# Patient Record
Sex: FEMALE | Race: BLACK OR AFRICAN AMERICAN
[De-identification: names, ages, dates, MRNs, and addresses within clinical notes are randomized per-mention and may not be internally consistent; named-entity substitution may affect disease eponyms.]

---

## 2019-12-16 ENCOUNTER — HOSPITAL ENCOUNTER (INPATIENT)
Dept: HOSPITAL 74 - FM/S | Age: 44
LOS: 1 days | Discharge: HOME | DRG: 403 | End: 2019-12-17
Attending: SURGERY | Admitting: SURGERY
Payer: COMMERCIAL

## 2019-12-16 VITALS — BODY MASS INDEX: 46.5 KG/M2

## 2019-12-16 DIAGNOSIS — R16.1: ICD-10-CM

## 2019-12-16 DIAGNOSIS — R16.0: ICD-10-CM

## 2019-12-16 DIAGNOSIS — E66.01: Primary | ICD-10-CM

## 2019-12-16 DIAGNOSIS — E11.9: ICD-10-CM

## 2019-12-16 LAB
ALBUMIN SERPL-MCNC: 3.4 G/DL (ref 3.4–5)
ALP SERPL-CCNC: 45 U/L (ref 45–117)
ALT SERPL-CCNC: 29 U/L (ref 13–61)
ANION GAP SERPL CALC-SCNC: 9 MMOL/L (ref 8–16)
AST SERPL-CCNC: 45 U/L (ref 15–37)
BILIRUB SERPL-MCNC: 0.5 MG/DL (ref 0.2–1)
BUN SERPL-MCNC: 9 MG/DL (ref 7–18)
CALCIUM SERPL-MCNC: 8.2 MG/DL (ref 8.5–10)
CHLORIDE SERPL-SCNC: 105 MMOL/L (ref 98–107)
CO2 SERPL-SCNC: 22 MMOL/L (ref 21–32)
CREAT SERPL-MCNC: 0.7 MG/DL (ref 0.55–1.3)
DEPRECATED RDW RBC AUTO: 15.6 % (ref 11.6–15.6)
GLUCOSE SERPL-MCNC: 134 MG/DL (ref 74–106)
HCT VFR BLD CALC: 32.9 % (ref 32.4–45.2)
HGB BLD-MCNC: 10.6 GM/DL (ref 10.7–15.3)
MCH RBC QN AUTO: 25.2 PG (ref 25.7–33.7)
MCHC RBC AUTO-ENTMCNC: 32.4 G/DL (ref 32–36)
MCV RBC: 77.9 FL (ref 80–96)
PLATELET # BLD AUTO: 227 K/MM3 (ref 134–434)
PMV BLD: 9.9 FL (ref 7.5–11.1)
POTASSIUM SERPLBLD-SCNC: 3.7 MMOL/L (ref 3.5–5.1)
PROT SERPL-MCNC: 6.3 G/DL (ref 6.4–8.2)
RBC # BLD AUTO: 4.22 M/MM3 (ref 3.6–5.2)
SODIUM SERPL-SCNC: 136 MMOL/L (ref 136–145)
WBC # BLD AUTO: 14.7 K/MM3 (ref 4–10.8)

## 2019-12-16 PROCEDURE — 0DB64Z3 EXCISION OF STOMACH, PERCUTANEOUS ENDOSCOPIC APPROACH, VERTICAL: ICD-10-PCS | Performed by: SURGERY

## 2019-12-16 PROCEDURE — 0FB24ZX EXCISION OF LEFT LOBE LIVER, PERCUTANEOUS ENDOSCOPIC APPROACH, DIAGNOSTIC: ICD-10-PCS | Performed by: SURGERY

## 2019-12-16 PROCEDURE — 0DJ04ZZ INSPECTION OF UPPER INTESTINAL TRACT, PERCUTANEOUS ENDOSCOPIC APPROACH: ICD-10-PCS | Performed by: SURGERY

## 2019-12-16 RX ADMIN — ENOXAPARIN SODIUM SCH MG: 40 INJECTION SUBCUTANEOUS at 21:50

## 2019-12-16 RX ADMIN — METOCLOPRAMIDE SCH MG: 5 INJECTION, SOLUTION INTRAMUSCULAR; INTRAVENOUS at 18:15

## 2019-12-16 RX ADMIN — ONDANSETRON SCH MG: 2 INJECTION INTRAMUSCULAR; INTRAVENOUS at 21:51

## 2019-12-16 RX ADMIN — METOCLOPRAMIDE SCH MG: 5 INJECTION, SOLUTION INTRAMUSCULAR; INTRAVENOUS at 22:47

## 2019-12-16 RX ADMIN — ONDANSETRON SCH MG: 2 INJECTION INTRAMUSCULAR; INTRAVENOUS at 18:15

## 2019-12-16 RX ADMIN — ACETAMINOPHEN SCH MG: 10 INJECTION, SOLUTION INTRAVENOUS at 22:47

## 2019-12-16 RX ADMIN — FAMOTIDINE SCH MLS/HR: 20 INJECTION, SOLUTION INTRAVENOUS at 21:51

## 2019-12-16 RX ADMIN — ACETAMINOPHEN SCH MG: 10 INJECTION, SOLUTION INTRAVENOUS at 18:55

## 2019-12-16 NOTE — OP
DATE OF OPERATION:  12/16/2019

 

PLACE OF SURGERY:  [facility], [address] [place] [address]

 

SURGEON:  Stanley Johansen MD

 

ASSISTANT:  Nikhil Torres M.D. 

 

PREOPERATIVE DIAGNOSIS:  

1.  Morbid obesity.  

2.  Diabetes mellitus.  

 

POSTOPERATIVE DIAGNOSIS:  

1.  Morbid obesity.  

2.  Diabetes mellitus.  

3.  Splenomegaly.  

4.  Oozing from gastric staple line.

 

PROCEDURE:

1.  Diagnostic laparoscopy.  

2.  Laparoscopic vertical sleeve gastrectomy. 

3.  Laparoscopic wedge liver biopsy. 

4.  Laparoscopic oversewing of gastric staple line for oozing.  

 

SPECIMEN:

1.  Greater curvature of the stomach.

2.  Liver biopsy.  

 

ESTIMATED BLOOD LOSS:  30 mL.

 

DRAINS:  None.  

 

ANESTHESIA:  GT.

 

BOOGIE:  Size 36 [place].  

 

REASON FOR PROCEDURE:  This is a 44-year-old female who presents to the office 
for

weight loss options and decided to proceed with laparoscopic, possible open

vertical sleeve gastrectomy, possible liver biopsy, upper endoscopy.  

 

ADDENDUM:  In addition, the gastric staple line needed to be oversewn

laparoscopically.  This was done with an endostitch device and surgitac  
suture.  Hemostasis was noted.  

 

RISKS AND BENEFITS:  After describing the different options for weight loss

management, the patient decided to proceed with a laparoscopic, possible open

vertical sleeve gastrectomy.  The patient was seen by the respective 
subspecialties

and cleared for surgery.  The risks and benefits of the procedure were 
explained. 

These included bleeding, infection, hernia, MI, DVT, PE, injury to surrounding

structures including the liver, colon, bowel, spleen, esophagus, vessel injury, 
nerve

injury, weight regain, gastric leak, staple line leak, sleeve leak, obstruction,

vitamin deficiency, hair loss and death as some of the possible complications.  
The

patient understood and signed informed consent.

 

DESCRIPTION OF PROCEDURE:  The patient was placed supine on the operating room 
table.

 The patient underwent general endotracheal intubation.  The arms were brought 
out at

90 degrees and secured.  A footboard was placed and the legs were secured 
laterally

with padding.  The abdomen was prepped and draped in the usual sterile fashion.
  A

timeout was performed.

 

An incision was made in the left upper quadrant and a Veress needle inserted. 

Pneumoperitoneum was established.  Subsequently, the Veress needle was removed 
and a

5-mm trocar was placed under direct visualization with the laparoscope.  The

laparoscopic camera was then inserted and inspection of the abdominal cavity was

performed.  An incision was then made in the supraumbilical area and a 15-mm 
trocar

was placed under direct visualization.  A 5-mm trocar was then placed in the 
right

upper quadrant and a 5-mm trocar was placed below the left subcostal margin.  A 
stab

wound was made in the subxiphoid area and a Trinity clamp inserted and removed to

dilate the tract.  A Salinas liver retractor was inserted.  The post was 
secured at

the bedside by the nursing staff.  The patient was placed in steep reverse

Trendelenburg position and the [place] liver retractor was used to secure the 
liver

towards the anterior abdominal wall.  

 

The pylorus was identified and 6 cm proximal to it, the lesser sac was entered 
using

the LigaSure device.  All lateral attachments to the greater curvature of the

stomach, including the short gastric vessels, were ligated using the LigaSure 
device

toward the gastrosplenic and gastrophrenic ligaments.  Once this was done in its

entirety, it was confirmed that all tubes within the nasal or oropharyngeal 
cavity,

including a temperature probe were removed by Anesthesia.  The bougie was then

inserted by Anesthesia.  Transection of the stomach was then begun staying 
adjacent

to the bougie but away from the angularis.  Transection of the stomach was 
performed

near the portion of the stomach where the lesser sac was entered.  Two 
laparoscopic

Endo-ADRIANNA black staples were used at this location.  Laparoscopic Endo ADRIANNA purple

staple loads were then used for the remainder of the transection until the 
greater

curvature of the stomach was fully transected.  This was done staying close to 
the

bougie.  Care was taken to stay away from the angle of His cephalad.  The 
staple line

was then inspected.  Hemostasis was identified.  A leak test was then 
performed.  It

was clamped distally to the staple line.  Irrigation solution was placed in the 
left

upper quadrant and air was insufflated by Anesthesia into the sleeve.  No leaks 
were

identified.  No obstruction was identified.  This was done through the entirety 
of

the staple line.  The stomach was suctioned and the bougie removed fully intact 
under

direct visualization.  At this point, the irrigation solution was suctioned and

again, hemostasis was noted.  

 

A wedge liver biopsy was then performed.  The left lobe of the liver was 
identified. 

A portion of the edge of the left lobe of the liver was grasped.  Using

electrocautery, a wedge of the left liver was excised.  The specimen was 
removed and

sent off the field.  Hemostasis of the wedge liver biopsy site was attained and 
noted

using electrocautery. 

 

The 15-mm supraumbilical trocar was then removed and the greater curvature 
specimen

removed from the site using a sponge stick andres.  A Semaj-Marisela device 
was then

used to close the fascia with a 0 Vicryl suture at the site.  Again, hemostasis 
was

noted. 

 

The Salinas liver retractor was then removed under direct visualization. 

Pneumoperitoneum was desufflated.  Hemostasis was noted at all incision sites 
and

Marcaine was injected at all incision sites.  A 3-0 Vicryl suture was used to 
close

the deep subcutaneous tissue at the 15-mm incision site.  All incision sites 
were

closed using 4-0 Biosyn.  Sterile dressings were applied.  The patient 
tolerated the

procedure well and was transferred to the recovery room in stable condition.  

 

 

LADAN [id number]

DD: 12/16/2019 17:34

DT: 12/16/2019 19:59

Job #:  [id number]

ROSANNA

## 2019-12-16 NOTE — HP
Admitting History and Physical





- Admission


Chief Complaint: Morbid obesity


History Source: Patient


Limitations to Obtaining History: No Limitations





- Past Medical History


...LMP: 11/18/19


Endocrine: Yes: Diabetes Mellitus





- Past Surgical History


Additional Past Surgical History: 





cervical polypectomy


tubal ligation





- Smoking History


Smoking history: Never smoked


Have you smoked in the past 12 months: No





- Alcohol/Substance Use


Hx Alcohol Use: Yes (RARELY)





- Social History


ADL: Independent





Home Medications





- Allergies


Allergies/Adverse Reactions: 


 Allergies











Allergy/AdvReac Type Severity Reaction Status Date / Time


 


No Known Drug Allergies Allergy   Verified 12/13/19 15:31














- Home Medications


Home Medications: 


Ambulatory Orders





Metformin HCl [Glucophage] 500 mg PO DAILY 12/13/19 











Family Medical History


Family History: Denies





Review of Systems





- Review of Systems


Constitutional: denies: Chills, Fever


HENT: reports: No Symptoms


Neck: reports: No Symptoms


Cardiovascular: reports: No Symptoms


Respiratory: reports: No Symptoms


Gastrointestinal: reports: No Symptoms


Neurological: reports: No Symptoms


Pain Intensity: 0





Physical Examination


Vital Signs: 


 Vital Signs











Temperature  98 F   12/16/19 12:40


 


Pulse Rate  76   12/16/19 12:40


 


Respiratory Rate  18   12/16/19 12:40


 


Blood Pressure  116/70   12/16/19 12:40


 


O2 Sat by Pulse Oximetry (%)      











Constitutional: Yes: Calm


Neck: Yes: WNL


Cardiovascular: Yes: WNL


Respiratory: Yes: WNL


Gastrointestinal: Yes: Soft, Abdomen, Obese


Neurological: Yes: Alert, Oriented





Problem List





- Problems


(1) Morbid obesity due to excess calories


Code(s): E66.01 - MORBID (SEVERE) OBESITY DUE TO EXCESS CALORIES   





(2) BMI 45.0-49.9, adult


Code(s): Z68.42 - BODY MASS INDEX (BMI) 45.0-49.9, ADULT   





Assessment/Plan





Laparoscopic possible open vertical sleeve gastrectomy, possible liver biopsy, 

upper endoscopy

## 2019-12-16 NOTE — OPR
Operative Note 





Operative Date: 12/16/19





Pre-Operative Diagnosis: Morbid obesity; BMI 46.5


Operation: 


 1. Diagnostic laparoscopy.  


 2. Laparoscopic vertical sleeve gastrectomy.  


 3. Laparoscopic wedge liver biopsy.  


 4. Laparoscopic oversewing of gastric staple line


Post-Operative Diagnosis: Same as Pre-op (as well as hepatomegaly and oozing 

from gastric staple line)


Surgeon: Stanley Johansen


Assistant: Nikhil Torres


Anesthesia: General


Specimens Removed: Greater curvature of stomach.  Liver biopsy.


Estimated Blood Loss (mls): 30


Drains & Tubes with Location: 36 Fr Bougie


Operative Report Dictated: Yes

## 2019-12-17 VITALS — HEART RATE: 88 BPM | TEMPERATURE: 98.9 F

## 2019-12-17 VITALS — DIASTOLIC BLOOD PRESSURE: 46 MMHG | SYSTOLIC BLOOD PRESSURE: 101 MMHG

## 2019-12-17 LAB
ALBUMIN SERPL-MCNC: 3.2 G/DL (ref 3.4–5)
ALP SERPL-CCNC: 39 U/L (ref 45–117)
ALT SERPL-CCNC: 35 U/L (ref 13–61)
ANION GAP SERPL CALC-SCNC: 8 MMOL/L (ref 8–16)
AST SERPL-CCNC: 44 U/L (ref 15–37)
BILIRUB SERPL-MCNC: 0.6 MG/DL (ref 0.2–1)
BUN SERPL-MCNC: 8 MG/DL (ref 7–18)
CALCIUM SERPL-MCNC: 8 MG/DL (ref 8.5–10)
CHLORIDE SERPL-SCNC: 107 MMOL/L (ref 98–107)
CO2 SERPL-SCNC: 20 MMOL/L (ref 21–32)
CREAT SERPL-MCNC: 0.7 MG/DL (ref 0.55–1.3)
DEPRECATED RDW RBC AUTO: 15.6 % (ref 11.6–15.6)
GLUCOSE SERPL-MCNC: 107 MG/DL (ref 74–106)
HCT VFR BLD CALC: 28.6 % (ref 32.4–45.2)
HGB BLD-MCNC: 9.3 GM/DL (ref 10.7–15.3)
MCH RBC QN AUTO: 25.1 PG (ref 25.7–33.7)
MCHC RBC AUTO-ENTMCNC: 32.5 G/DL (ref 32–36)
MCV RBC: 77.4 FL (ref 80–96)
PLATELET # BLD AUTO: 216 K/MM3 (ref 134–434)
PMV BLD: 9.8 FL (ref 7.5–11.1)
POTASSIUM SERPLBLD-SCNC: 4 MMOL/L (ref 3.5–5.1)
PROT SERPL-MCNC: 6.2 G/DL (ref 6.4–8.2)
RBC # BLD AUTO: 3.69 M/MM3 (ref 3.6–5.2)
SODIUM SERPL-SCNC: 135 MMOL/L (ref 136–145)
WBC # BLD AUTO: 11.6 K/MM3 (ref 4–10.8)

## 2019-12-17 RX ADMIN — METOCLOPRAMIDE SCH MG: 5 INJECTION, SOLUTION INTRAMUSCULAR; INTRAVENOUS at 12:20

## 2019-12-17 RX ADMIN — ACETAMINOPHEN SCH MG: 10 INJECTION, SOLUTION INTRAVENOUS at 06:31

## 2019-12-17 RX ADMIN — ENOXAPARIN SODIUM SCH MG: 40 INJECTION SUBCUTANEOUS at 09:45

## 2019-12-17 RX ADMIN — METOCLOPRAMIDE SCH MG: 5 INJECTION, SOLUTION INTRAMUSCULAR; INTRAVENOUS at 18:06

## 2019-12-17 RX ADMIN — SODIUM CHLORIDE SCH MLS/HR: 9 INJECTION, SOLUTION INTRAVENOUS at 02:26

## 2019-12-17 RX ADMIN — ONDANSETRON SCH MG: 2 INJECTION INTRAMUSCULAR; INTRAVENOUS at 14:04

## 2019-12-17 RX ADMIN — INSULIN ASPART SCH: 100 INJECTION, SOLUTION INTRAVENOUS; SUBCUTANEOUS at 18:05

## 2019-12-17 RX ADMIN — ONDANSETRON SCH MG: 2 INJECTION INTRAMUSCULAR; INTRAVENOUS at 02:26

## 2019-12-17 RX ADMIN — FAMOTIDINE SCH MLS/HR: 20 INJECTION, SOLUTION INTRAVENOUS at 09:45

## 2019-12-17 RX ADMIN — ONDANSETRON SCH MG: 2 INJECTION INTRAMUSCULAR; INTRAVENOUS at 18:06

## 2019-12-17 RX ADMIN — SODIUM CHLORIDE SCH MLS/HR: 9 INJECTION, SOLUTION INTRAVENOUS at 18:06

## 2019-12-17 RX ADMIN — INSULIN ASPART SCH: 100 INJECTION, SOLUTION INTRAVENOUS; SUBCUTANEOUS at 12:21

## 2019-12-17 RX ADMIN — INSULIN ASPART SCH: 100 INJECTION, SOLUTION INTRAVENOUS; SUBCUTANEOUS at 06:31

## 2019-12-17 RX ADMIN — ONDANSETRON SCH MG: 2 INJECTION INTRAMUSCULAR; INTRAVENOUS at 09:45

## 2019-12-17 RX ADMIN — ACETAMINOPHEN SCH MG: 10 INJECTION, SOLUTION INTRAVENOUS at 12:19

## 2019-12-17 RX ADMIN — ONDANSETRON SCH MG: 2 INJECTION INTRAMUSCULAR; INTRAVENOUS at 06:30

## 2019-12-17 RX ADMIN — METOCLOPRAMIDE SCH MG: 5 INJECTION, SOLUTION INTRAMUSCULAR; INTRAVENOUS at 06:27

## 2019-12-17 NOTE — PN
Progress Note (short form)





- Note


Progress Note: 





ANESTHESIA POSTOP


45 YO female POD#1 s/p lap gastric sleeve


Patient resting in bed. Pain adequately controlled.


VSS, Afebrile


Continue current care. Encouraged IS and ambulation. No anesthetic 

complications.

## 2019-12-17 NOTE — DS
Physical Exam: 


SUBJECTIVE: Patient seen and examined








OBJECTIVE:





 Vital Signs











Temperature  99.2 F   12/17/19 06:00


 


Pulse Rate  89   12/17/19 06:00


 


Respiratory Rate  18   12/17/19 06:00


 


Blood Pressure  111/48 L  12/17/19 06:00


 


O2 Sat by Pulse Oximetry (%)  98   12/17/19 06:00








PHYSICAL EXAM





GENERAL: The patient is awake, alert, and fully oriented, in no acute distress.


HEAD: Normal with no signs of trauma.


EYES: PERRL, extraocular movements intact, sclera anicteric, conjunctiva clear. 


ENT: Ears normal, nares patent, oropharynx clear without exudates, moist mucous 

membranes.


NECK: Trachea midline, full range of motion, supple. 


LUNGS: breathing comfortably,  no accessory muscle use. 


ABDOMEN: Soft, mild diffuse tenderness, nondistended, no guarding, no rebound, 

no hepatosplenomegaly, no masses. Incisions are clean with no erythema or 

discharge. 


EXTREMITIES:  warm, well-perfused, no edema. 


NEUROLOGICAL: Cranial nerves II through XII grossly intact. Normal speech, gait 

not observed.


PSYCH: Normal mood, normal affect.


SKIN: Warm, dry, normal turgor, no rashes or lesions noted.





LABS


CBC,CMP











WBC  14.7 K/mm3 (4.0-10.8)  H  12/16/19  18:02    


 


RBC  4.22 M/mm3 (3.60-5.2)   12/16/19  18:02    


 


Hgb  10.6 GM/dl (10.7-15.3)  L  12/16/19  18:02    


 


Hct  32.9 % (32.4-45.2)   12/16/19  18:02    


 


MCV  77.9 fl (80-96)  L  12/16/19  18:02    


 


MCH  25.2 pg (25.7-33.7)  L  12/16/19  18:02    


 


MCHC  32.4 g/dl (32.0-36.0)   12/16/19  18:02    


 


RDW  15.6 % (11.6-15.6)   12/16/19  18:02    


 


Plt Count  227 K/MM3 (134-434)   12/16/19  18:02    


 


MPV  9.9 fl (7.5-11.1)   12/16/19  18:02    


 


Sodium  136 mmol/L (136-145)   12/16/19  18:02    


 


Potassium  3.7 mmol/L (3.5-5.1)   12/16/19  18:02    


 


Chloride  105 mmol/L ()   12/16/19  18:02    


 


Carbon Dioxide  22 mmol/L (21-32)   12/16/19  18:02    


 


Anion Gap  9 MMOL/L (8-16)   12/16/19  18:02    


 


BUN  9.0 mg/dl (7-18)   12/16/19  18:02    


 


Creatinine  0.7 mg/dl (0.55-1.3)   12/16/19  18:02    


 


Est GFR (CKD-EPI)AfAm  122.13   12/16/19  18:02    


 


Est GFR (CKD-EPI)NonAf  105.37   12/16/19  18:02    


 


POC Glucometer  103 UNITS ()   12/17/19  06:07    


 


Random Glucose  134 mg/dl ()  H  12/16/19  18:02    


 


Calcium  8.2 mg/dl (8.5-10)  L  12/16/19  18:02    


 


Total Bilirubin  0.5 mg/dl (0.2-1)   12/16/19  18:02    


 


AST  45 U/L (15-37)  H  12/16/19  18:02    


 


ALT  29 U/L (13-61)   12/16/19  18:02    


 


Alkaline Phosphatase  45 U/L ()   12/16/19  18:02    


 


Total Protein  6.3 g/dl (6.4-8.2)  L  12/16/19  18:02    


 


Albumin  3.4 g/dl (3.4-5.0)   12/16/19  18:02    











HOSPITAL COURSE:





Date of Admission:12/16/19





Date of Discharge: 12/17/19





HOSPITAL COURSE: 


The patient was admitted to the Med-Surg Unit after elective bariatric surgery. 

Now, s/p laparoscopic 


vertical sleeve gastrectomy.





The day of surgery, the patient ambulated the hallways with assistance. The 

patient was monitored with 


remote tele/continuous pulse ox. Narcotic and non-narcotic pain management 

control was achieved 


with oral and IV pain control. Upper GI series was obtained the following 

morning and no leak, 


extravastion or gastric outlet obstruction. Started on a Bariatric Stage 1 diet 

and tolerated well.





Angelique-operative IV ABX were administered in addition to GI prophylaxis. DVT 

prophylaxis was achieved 


with SCDs and early ambulation.





The discharge instructions and an oral pain management plan were reviewed with 

the patient. All 


questions answered. Above plan discussed with Dr. Johansen and agreed.





Minutes to complete discharge: 20





Visit type





- Case Type


Case Type: Scheduled





- Emergency


Emergency Visit: No





- New patient


This patient is new to me today: Yes


Date on this admission: 12/17/19





- Critical Care


Critical Care patient: No

## 2019-12-19 NOTE — PATH
Surgical Pathology Report



Patient Name:  HARIS PÉREZ

Accession #:  [id number]

Med. Rec. #:  [id number]                                                        

   /Age/Gender:  1975 (Age: 44) / F

Account:  [id number]                                                          

             Location: [facility]

Taken:  2019

Received:  2019

Reported:  2019

Physicians:  Stanley Johansen M.D.

  



Specimen(s) Received

A: GREATER CURVATURE STOMACH 

B: LIVER BIOPSY 





Clinical History

Morbid obesity







Final Diagnosis

A. GREATER CURVATURE STOMACH, LAPAROSCOPIC VERTICAL SLEEVE GASTRECTOMY:

SEGMENT OF STOMACH SHOWING CHRONIC GASTRITIS.

IMMUNOSTAINING IS NEGATIVE FOR H. PYLORI ORGANISMS.



B. LIVER, BIOPSY:

LIVER TISSUE WITH FOCAL STEATOSIS (~-2%).

NO HISTOLOGIC EVIDENCE OF HEPATITIS.

NO INCREASE IN FIBROSIS (TRICHROME STAIN) OR IRON (IRON STAIN) DEPOSITION.





***Electronically Signed***

Abner Montes M.D.





Gross Description

A.  Received in formalin, labeled "greater curvature of stomach," is a 70 gram,

16.0 x 2.5 x 2.3 cm. portion of stomach with a stapled margin of resection. The

serosa is tan-gray with minimal attached fat. The mucosa is tan-pink with normal

folds. No mucosal masses are identified. Representative sections are submitted

in one cassette.



B. See been formalin labeled "liver biopsy," is a 1.8 x 1.5 x 0.6 cm tan portion

of soft tissue, consistent with a liver biopsy. Representative sections are

submitted in one cassette.

/2019



saudi

## 2023-09-07 ENCOUNTER — HOSPITAL ENCOUNTER (OUTPATIENT)
Dept: HOSPITAL 74 - JASU-ENDO | Age: 48
Discharge: HOME | End: 2023-09-07
Attending: INTERNAL MEDICINE
Payer: COMMERCIAL

## 2023-09-07 VITALS — HEART RATE: 72 BPM | SYSTOLIC BLOOD PRESSURE: 128 MMHG | DIASTOLIC BLOOD PRESSURE: 62 MMHG

## 2023-09-07 VITALS — BODY MASS INDEX: 31.4 KG/M2

## 2023-09-07 VITALS — RESPIRATION RATE: 16 BRPM | TEMPERATURE: 97.4 F

## 2023-09-07 DIAGNOSIS — Z12.11: Primary | ICD-10-CM

## 2023-09-07 DIAGNOSIS — R19.5: ICD-10-CM

## 2023-09-07 DIAGNOSIS — K63.5: ICD-10-CM

## 2023-09-07 PROCEDURE — 0DBN8ZX EXCISION OF SIGMOID COLON, VIA NATURAL OR ARTIFICIAL OPENING ENDOSCOPIC, DIAGNOSTIC: ICD-10-PCS | Performed by: INTERNAL MEDICINE
